# Patient Record
Sex: FEMALE | URBAN - METROPOLITAN AREA
[De-identification: names, ages, dates, MRNs, and addresses within clinical notes are randomized per-mention and may not be internally consistent; named-entity substitution may affect disease eponyms.]

---

## 2024-06-17 ENCOUNTER — APPOINTMENT (OUTPATIENT)
Dept: URBAN - METROPOLITAN AREA CLINIC 309 | Age: 49
Setting detail: DERMATOLOGY
End: 2024-06-17

## 2024-06-17 DIAGNOSIS — L82.0 INFLAMED SEBORRHEIC KERATOSIS: ICD-10-CM

## 2024-06-17 DIAGNOSIS — L90.5 SCAR CONDITIONS AND FIBROSIS OF SKIN: ICD-10-CM

## 2024-06-17 DIAGNOSIS — Z41.9 ENCOUNTER FOR PROCEDURE FOR PURPOSES OTHER THAN REMEDYING HEALTH STATE, UNSPECIFIED: ICD-10-CM

## 2024-06-17 PROCEDURE — OTHER COUNSELING: OTHER

## 2024-06-17 PROCEDURE — OTHER REASSURANCE: OTHER

## 2024-06-17 PROCEDURE — OTHER LIQUID NITROGEN: OTHER

## 2024-06-17 PROCEDURE — OTHER PHOTO-DOCUMENTATION: OTHER

## 2024-06-17 PROCEDURE — OTHER MIPS QUALITY: OTHER

## 2024-06-17 PROCEDURE — 17110 DESTRUCT B9 LESION 1-14: CPT | Mod: 52

## 2024-06-17 PROCEDURE — 99203 OFFICE O/P NEW LOW 30 MIN: CPT | Mod: 25

## 2024-06-17 PROCEDURE — OTHER ADDITIONAL NOTES: OTHER

## 2024-06-17 PROCEDURE — OTHER COSMETIC CONSULTATION: CUTERA SECRET PRO: OTHER

## 2024-06-17 ASSESSMENT — LOCATION DETAILED DESCRIPTION DERM: LOCATION DETAILED: RIGHT INFERIOR CENTRAL MALAR CHEEK

## 2024-06-17 ASSESSMENT — LOCATION SIMPLE DESCRIPTION DERM: LOCATION SIMPLE: RIGHT CHEEK

## 2024-06-17 ASSESSMENT — LOCATION ZONE DERM: LOCATION ZONE: FACE

## 2024-06-17 ASSESSMENT — PAIN INTENSITY VAS: HOW INTENSE IS YOUR PAIN 0 BEING NO PAIN, 10 BEING THE MOST SEVERE PAIN POSSIBLE?: 2/10 PAIN

## 2024-06-17 NOTE — PROCEDURE: LIQUID NITROGEN
Render Post-Care Instructions In Note?: yes
Duration Of Freeze Thaw-Cycle (Seconds): 5-10
Medical Necessity Clause: This procedure was medically necessary because the lesions that were treated were:
Render Note In Bullet Format When Appropriate: No
Number Of Freeze-Thaw Cycles: 2 freeze-thaw cycles
Application Tool (Optional): Cry-AC
Spray Paint Text: The liquid nitrogen was applied to the skin utilizing a spray paint frosting technique.
Post-Care Instructions: I reviewed with the patient in detail post-care instructions. Patient is to wear sunprotection, and avoid picking at any of the treated lesions. Pt may apply Vaseline to crusted or scabbing areas. Procedure tolerated well and without any immediate post-procedural complications.
Consent: The patient's consent was obtained including but not limited to risks of crusting, scabbing, blistering, scarring, darker or lighter pigmentary change, recurrence, incomplete removal and infection.
Detail Level: Detailed
Medical Necessity Information: It is in your best interest to select a reason for this procedure from the list below. All of these items fulfill various CMS LCD requirements except the new and changing color options.

## 2024-06-17 NOTE — PROCEDURE: ADDITIONAL NOTES
Additional Notes: Quoted $600/tx for light CO2 laser resurfacing x 30 mins and $450 per session for laser renata x 20 mins
Detail Level: Simple
Render Risk Assessment In Note?: no

## 2024-07-17 ENCOUNTER — APPOINTMENT (OUTPATIENT)
Dept: URBAN - METROPOLITAN AREA CLINIC 309 | Age: 49
Setting detail: DERMATOLOGY
End: 2024-07-17

## 2024-07-17 DIAGNOSIS — L81.1 CHLOASMA: ICD-10-CM

## 2024-07-17 DIAGNOSIS — L65.0 TELOGEN EFFLUVIUM: ICD-10-CM

## 2024-07-17 DIAGNOSIS — L82.0 INFLAMED SEBORRHEIC KERATOSIS: ICD-10-CM

## 2024-07-17 PROCEDURE — OTHER COUNSELING: OTHER

## 2024-07-17 PROCEDURE — 17110 DESTRUCT B9 LESION 1-14: CPT

## 2024-07-17 PROCEDURE — 99214 OFFICE O/P EST MOD 30 MIN: CPT | Mod: 25

## 2024-07-17 PROCEDURE — OTHER MIPS QUALITY: OTHER

## 2024-07-17 PROCEDURE — OTHER ADDITIONAL NOTES: OTHER

## 2024-07-17 PROCEDURE — OTHER PRESCRIPTION: OTHER

## 2024-07-17 PROCEDURE — OTHER LIQUID NITROGEN: OTHER

## 2024-07-17 RX ORDER — HYDROQUINONE 8% 8 G/100G
EMULSION TOPICAL
Qty: 30 | Refills: 2 | Status: ERX | COMMUNITY
Start: 2024-07-17

## 2024-07-17 ASSESSMENT — LOCATION ZONE DERM
LOCATION ZONE: FACE
LOCATION ZONE: SCALP

## 2024-07-17 ASSESSMENT — LOCATION SIMPLE DESCRIPTION DERM
LOCATION SIMPLE: SCALP
LOCATION SIMPLE: LEFT CHEEK
LOCATION SIMPLE: RIGHT CHEEK

## 2024-07-17 ASSESSMENT — LOCATION DETAILED DESCRIPTION DERM
LOCATION DETAILED: LEFT INFERIOR CENTRAL MALAR CHEEK
LOCATION DETAILED: LEFT SUPERIOR PARIETAL SCALP
LOCATION DETAILED: RIGHT INFERIOR CENTRAL MALAR CHEEK

## 2024-07-17 ASSESSMENT — SEVERITY ASSESSMENT: SEVERITY: MODERATE, MODERATELY DARKER THAN THE SURROUNDING NORMAL SKIN

## 2024-07-17 NOTE — PROCEDURE: ADDITIONAL NOTES
Additional Notes: - Advised patient to get blood transfusion for her Anemia\\n- informed pt how hair loss will continue until iron levels and ferritin normalize
Render Risk Assessment In Note?: no
Detail Level: Simple
Additional Notes: - Recommend [employer] CE Ferulic (patient purchased)

## 2024-10-03 ENCOUNTER — APPOINTMENT (OUTPATIENT)
Dept: URBAN - METROPOLITAN AREA CLINIC 309 | Age: 49
Setting detail: DERMATOLOGY
End: 2024-10-03

## 2024-10-03 DIAGNOSIS — L82.0 INFLAMED SEBORRHEIC KERATOSIS: ICD-10-CM

## 2024-10-03 DIAGNOSIS — L81.1 CHLOASMA: ICD-10-CM

## 2024-10-03 PROCEDURE — OTHER LIQUID NITROGEN: OTHER

## 2024-10-03 PROCEDURE — 17110 DESTRUCT B9 LESION 1-14: CPT

## 2024-10-03 PROCEDURE — OTHER COUNSELING: OTHER

## 2024-10-03 PROCEDURE — OTHER MIPS QUALITY: OTHER

## 2024-10-03 PROCEDURE — OTHER ADDITIONAL NOTES: OTHER

## 2024-10-03 PROCEDURE — OTHER PRESCRIPTION: OTHER

## 2024-10-03 PROCEDURE — 99214 OFFICE O/P EST MOD 30 MIN: CPT | Mod: 25

## 2024-10-03 PROCEDURE — OTHER TREATMENT REGIMEN: OTHER

## 2024-10-03 RX ORDER — HYDROQUINONE 8% 8 G/100G
EMULSION TOPICAL
Qty: 30 | Refills: 2 | Status: ERX

## 2024-10-03 ASSESSMENT — LOCATION ZONE DERM: LOCATION ZONE: FACE

## 2024-10-03 ASSESSMENT — LOCATION DETAILED DESCRIPTION DERM
LOCATION DETAILED: RIGHT INFERIOR CENTRAL MALAR CHEEK
LOCATION DETAILED: LEFT INFERIOR CENTRAL MALAR CHEEK

## 2024-10-03 ASSESSMENT — LOCATION SIMPLE DESCRIPTION DERM
LOCATION SIMPLE: LEFT CHEEK
LOCATION SIMPLE: RIGHT CHEEK

## 2024-10-03 NOTE — PROCEDURE: TREATMENT REGIMEN
Initiate Treatment: hydroquinone 8 % topical emulsion with kojic acid, tretinoin, fluocinonide, and niacinamide - apply nightly to AA on face
Detail Level: Zone

## 2024-10-03 NOTE — PROCEDURE: ADDITIONAL NOTES
Render Risk Assessment In Note?: no
Detail Level: Simple
Additional Notes: - Recommend [employer] CE Ferulic (patient purchased)